# Patient Record
(demographics unavailable — no encounter records)

---

## 2025-04-11 NOTE — ASSESSMENT
[FreeTextEntry1] : Anxiety: stable c/w Venlafaxine   Obesity: continue to focus on diet and exercise as tolerated  Mild hyperlipidemia: continue to focus on diet and exercise as tolerated check blood work   Vitamin D deficiency: check blood work  HCM: check blood work up to date with GYN has referral for mammogram will plan to go for a colonoscopy

## 2025-04-11 NOTE — HISTORY OF PRESENT ILLNESS
[FreeTextEntry1] : Follow up- medication refill  [de-identified] : 50 year old female presents for a follow up. She has h/o anxiety, on Venlafaxine. She states her mood has been stable- coping with life stressors, unfortunately her step-mother has been diagnosed with glioblastoma. Her medication was recently refilled. She was seen recently seen by GYN and has a referral for a mammogram. She is due for colon cancer screening- will plan to schedule a colonoscopy. Has h/o Obesity- trying to lose weight with diet and exercise.

## 2025-04-11 NOTE — HEALTH RISK ASSESSMENT
[Never] : Never [0] : 2) Feeling down, depressed, or hopeless: Not at all (0) [PHQ-2 Negative - No further assessment needed] : PHQ-2 Negative - No further assessment needed [[id number]] : 0